# Patient Record
Sex: FEMALE | Race: WHITE | NOT HISPANIC OR LATINO
[De-identification: names, ages, dates, MRNs, and addresses within clinical notes are randomized per-mention and may not be internally consistent; named-entity substitution may affect disease eponyms.]

---

## 2019-05-16 ENCOUNTER — RESULT REVIEW (OUTPATIENT)
Age: 24
End: 2019-05-16

## 2020-09-05 ENCOUNTER — RESULT REVIEW (OUTPATIENT)
Age: 25
End: 2020-09-05

## 2024-02-09 ENCOUNTER — EMERGENCY (EMERGENCY)
Facility: HOSPITAL | Age: 29
LOS: 1 days | Discharge: HOME | End: 2024-02-09
Attending: STUDENT IN AN ORGANIZED HEALTH CARE EDUCATION/TRAINING PROGRAM
Payer: COMMERCIAL

## 2024-02-09 VITALS
HEIGHT: 65 IN | TEMPERATURE: 98 F | RESPIRATION RATE: 20 BRPM | HEART RATE: 82 BPM | SYSTOLIC BLOOD PRESSURE: 119 MMHG | OXYGEN SATURATION: 100 % | WEIGHT: 164.24 LBS | DIASTOLIC BLOOD PRESSURE: 84 MMHG

## 2024-02-09 LAB — HIV 1 & 2 AB SERPL IA.RAPID: SIGNIFICANT CHANGE UP

## 2024-02-09 PROCEDURE — 86703 HIV-1/HIV-2 1 RESULT ANTBDY: CPT

## 2024-02-09 PROCEDURE — 36415 COLL VENOUS BLD VENIPUNCTURE: CPT

## 2024-02-09 PROCEDURE — 99283 EMERGENCY DEPT VISIT LOW MDM: CPT

## 2024-02-09 NOTE — ED PROVIDER NOTE - PHYSICAL EXAMINATION
Const: AOX3 nontoxic appearing, no apparent respiratory or physical distress. Stable gait   HEENT: NC/AT.   Eyes: ERMA. EOMI  Neck: Soft and supple. Full ROM without pain.  Resp: Speaking in full sentences. No evidence of respiratory distress.   Skin: left 3rd digit no wound noted no erythema or bleeding . cr less than 2 sec   MSk left hand ROM grossly intact - radial pulse +2   Neuro: Awake, alert & oriented x 3. Moves all extremities symmetrically.

## 2024-02-09 NOTE — ED PROVIDER NOTE - NSFOLLOWUPINSTRUCTIONS_ED_ALL_ED_FT
Needlestick and Sharps Injury       A needlestick injury happens when a person gets poked (stuck) by a needle or sharp tool (sharps) that may have someone else's blood on it. A needlestick injury can happen to a health care worker, or to anyone who is exposed to needles. The injury may expose you to blood that carries infections such as:    Hepatitis B.  Hepatitis C.  Human immunodeficiency virus (HIV).    If you have a needle stick injury or think you may have been exposed to blood or body fluids:    Wash the injured area right away with soap and water.  Place a bandage or clean towel on the wound and apply gentle pressure to stop the bleeding. Do not squeeze or rub the area.  Notify a work place supervisor or doctor. Follow any procedures in your work place.  Tell your doctor if you are pregnant or breastfeeding.    Treatments may include:    Blood tests to make sure that you have no infection.  Tetanus shot.  Hepatitis B shot.  Medicines to stop or treat infection.  Treatment for the wound.    Follow these instructions at home:      Wound care    There are many ways to close and cover a wound. For example, a wound can be covered with sutures, skin glue, or adhesive strips. Follow instructions from your doctor about:    How to take care of your wound.  When and how you should change your bandage (dressing).  Keep the bandage dry as told by your doctor.   Do not take baths, swim, use a hot tub, or do anything that would put your wound underwater until your doctor approves.  Check your wound every day for signs of infection. Check for:    Redness, swelling, or pain.  Fluid or blood.  Pus or a bad smell.  Warmth.        General instructions    Take over-the-counter and prescription medicines only as told by your doctor.  If you were prescribed an antibiotic medicine, take it as told by your doctor. Do not stop using the antibiotic even if you start to feel better.  Keep all follow-up visits as told by your doctor. This is important.    Contact a doctor if:  The poked area is red, swollen, or painful.  You have a fever.  You feel worried (anxious), mad, or sad (depressed).  You have trouble sleeping.  Your skin or the whites of your eyes look yellow (jaundice).  You have belly pain or a feeling of fullness.  You have tiredness (fatigue).  You feel sickness in a lot of your body (malaise).  You get infections often.    Summary  A needlestick injury happens when a person gets poked (stuck) by a needle that may have someone else's blood on it.  It is treated by cleaning the injured area right away with soap and water. You may get tetanus and hepatitis B shots. You may also get medicines for infections.  Take medicine and care for your wound as told by your doctor.    ADDITIONAL NOTES AND INSTRUCTIONS    Please follow up with your Primary MD in 24-48 hr.  Seek immediate medical care for any new/worsening signs or symptoms. PLEASE FOLLOW UP WITHIN [facility] IN 3 DAYS   PLEASE FOLLOW WITH [facility] PATIENT IF ANY ABNORMAL FINDING COME BACK IN ER foR PEP MEDICATION        Needlestick and Sharps Injury       A needlestick injury happens when a person gets poked (stuck) by a needle or sharp tool (sharps) that may have someone else's blood on it. A needlestick injury can happen to a health care worker, or to anyone who is exposed to needles. The injury may expose you to blood that carries infections such as:    Hepatitis B.  Hepatitis C.  Human immunodeficiency virus (HIV).    If you have a needle stick injury or think you may have been exposed to blood or body fluids:    Wash the injured area right away with soap and water.  Place a bandage or clean towel on the wound and apply gentle pressure to stop the bleeding. Do not squeeze or rub the area.  Notify a work place supervisor or doctor. Follow any procedures in your work place.  Tell your doctor if you are pregnant or breastfeeding.    Treatments may include:    Blood tests to make sure that you have no infection.  Tetanus shot.  Hepatitis B shot.  Medicines to stop or treat infection.  Treatment for the wound.    Follow these instructions at home:      Wound care    There are many ways to close and cover a wound. For example, a wound can be covered with sutures, skin glue, or adhesive strips. Follow instructions from your doctor about:    How to take care of your wound.  When and how you should change your bandage (dressing).  Keep the bandage dry as told by your doctor.   Do not take baths, swim, use a hot tub, or do anything that would put your wound underwater until your doctor approves.  Check your wound every day for signs of infection. Check for:    Redness, swelling, or pain.  Fluid or blood.  Pus or a bad smell.  Warmth.        General instructions    Take over-the-counter and prescription medicines only as told by your doctor.  If you were prescribed an antibiotic medicine, take it as told by your doctor. Do not stop using the antibiotic even if you start to feel better.  Keep all follow-up visits as told by your doctor. This is important.    Contact a doctor if:  The poked area is red, swollen, or painful.  You have a fever.  You feel worried (anxious), mad, or sad (depressed).  You have trouble sleeping.  Your skin or the whites of your eyes look yellow (jaundice).  You have belly pain or a feeling of fullness.  You have tiredness (fatigue).  You feel sickness in a lot of your body (malaise).  You get infections often.    Summary  A needlestick injury happens when a person gets poked (stuck) by a needle that may have someone else's blood on it.  It is treated by cleaning the injured area right away with soap and water. You may get tetanus and hepatitis B shots. You may also get medicines for infections.  Take medicine and care for your wound as told by your doctor.    ADDITIONAL NOTES AND INSTRUCTIONS    Please follow up with your Primary MD in 24-48 hr.  Seek immediate medical care for any new/worsening signs or symptoms.

## 2024-02-09 NOTE — ED PROVIDER NOTE - PATIENT PORTAL LINK FT
You can access the FollowMyHealth Patient Portal offered by [facility] by registering at the following website: [email or web address]. By joining [facility]’s FollowMyHealth portal, you will also be able to view your health information using other applications (apps) compatible with our system.

## 2024-02-09 NOTE — ED PROVIDER NOTE - CLINICAL SUMMARY MEDICAL DECISION MAKING FREE TEXT BOX
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 patient states she was working in the OR had a left mid finger got stuck with the needlestick. she had double gloves on. was bleeding she removed it and she scrubbed the area. she is updated with her tetanus and had titers. patient is postop room  waiting for the consent to mitzy the labs from source patient. patient wants to wait to get checked for HIV patient does not want to get pep for now

## 2024-02-09 NOTE — ED PROVIDER NOTE - ATTENDING APP SHARED VISIT CONTRIBUTION OF CARE
Pt with suture needle fingerstick today. cleaned area well. Pt does not want PEP.  source patient being tested.

## 2024-02-09 NOTE — ED PROVIDER NOTE - OBJECTIVE STATEMENT
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 she had double gloves on. was bleeding she removed it and she scrubbed the area. she is updated with her tetanus and had titers. patient is postop room  waiting for the consent to mitzy the labs from source patient. patient wants to wait to get checked for HIV patient does not want to get pep for now

## 2025-01-13 ENCOUNTER — APPOINTMENT (OUTPATIENT)
Dept: SURGERY | Facility: CLINIC | Age: 30
End: 2025-01-13

## 2025-03-12 ENCOUNTER — APPOINTMENT (OUTPATIENT)
Dept: OBGYN | Facility: CLINIC | Age: 30
End: 2025-03-12

## 2025-06-12 ENCOUNTER — APPOINTMENT (OUTPATIENT)
Dept: SURGERY | Facility: CLINIC | Age: 30
End: 2025-06-12
Payer: COMMERCIAL

## 2025-06-12 VITALS
HEART RATE: 83 BPM | BODY MASS INDEX: 25.16 KG/M2 | HEIGHT: 65 IN | TEMPERATURE: 98.1 F | SYSTOLIC BLOOD PRESSURE: 118 MMHG | WEIGHT: 151 LBS | RESPIRATION RATE: 16 BRPM | OXYGEN SATURATION: 99 % | DIASTOLIC BLOOD PRESSURE: 77 MMHG

## 2025-06-12 PROCEDURE — 99204 OFFICE O/P NEW MOD 45 MIN: CPT

## 2025-06-25 ENCOUNTER — APPOINTMENT (OUTPATIENT)
Dept: SURGERY | Facility: CLINIC | Age: 30
End: 2025-06-25